# Patient Record
Sex: FEMALE | Race: WHITE | NOT HISPANIC OR LATINO | URBAN - METROPOLITAN AREA
[De-identification: names, ages, dates, MRNs, and addresses within clinical notes are randomized per-mention and may not be internally consistent; named-entity substitution may affect disease eponyms.]

---

## 2018-09-05 ENCOUNTER — OFFICE (OUTPATIENT)
Dept: URBAN - METROPOLITAN AREA CLINIC 101 | Facility: CLINIC | Age: 63
End: 2018-09-05

## 2018-09-05 VITALS
SYSTOLIC BLOOD PRESSURE: 145 MMHG | HEIGHT: 66 IN | HEART RATE: 65 BPM | TEMPERATURE: 98.1 F | WEIGHT: 150 LBS | DIASTOLIC BLOOD PRESSURE: 79 MMHG

## 2018-09-05 DIAGNOSIS — R19.4 CHANGE IN BOWEL HABIT: ICD-10-CM

## 2018-09-05 DIAGNOSIS — K59.09 OTHER CONSTIPATION: ICD-10-CM

## 2018-09-05 PROCEDURE — 99204 OFFICE O/P NEW MOD 45 MIN: CPT

## 2018-09-05 NOTE — SERVICEHPINOTES
MARK ROGEL   is a   63   year old    female who is being seen in consultation at the request of   DWAYNE FOURNIER   for constipation. Patient is Somali speaking and presents to OV with son who is translating. For past 1 year, she reports a change in bowel habits. After eating she feels gassy/bloated and feels constipated. BMs are once daily she states it is often BSS type 2 but some times BSS type 7. Every morning she used to drink 1% milk and has been avoiding this with some improvement along with drinking more water. She notes that cheese also tends to give her diarrhea. She reports generalized bloating/gassy feeling no point tenderness. No blood in the stool. She states symptoms seem to correlate with stress as well. Appetite is good. No weight loss, fever, or chills. She denies family hx of colon cancer, polyps, or IBD. She has never had a colonoscopy. She has taken pepto bismol prn for diarrhea which seems to help. She als has been taking some fiber gummies which has helped her have mre frequent BMs. No recent antibiotics--son believes it has been over 1 year since she has had antibiotics.

## 2018-09-12 ENCOUNTER — ON CAMPUS - OUTPATIENT (OUTPATIENT)
Dept: URBAN - METROPOLITAN AREA HOSPITAL 35 | Facility: HOSPITAL | Age: 63
End: 2018-09-12

## 2018-09-12 DIAGNOSIS — Z12.11 ENCOUNTER FOR SCREENING FOR MALIGNANT NEOPLASM OF COLON: ICD-10-CM

## 2018-09-12 PROCEDURE — G0121 COLON CA SCRN NOT HI RSK IND: HCPCS
